# Patient Record
Sex: FEMALE
[De-identification: names, ages, dates, MRNs, and addresses within clinical notes are randomized per-mention and may not be internally consistent; named-entity substitution may affect disease eponyms.]

---

## 2019-04-26 ENCOUNTER — HOSPITAL ENCOUNTER (EMERGENCY)
Dept: HOSPITAL 31 - C.ER | Age: 24
Discharge: HOME | End: 2019-04-26
Payer: MEDICAID

## 2019-04-26 VITALS — RESPIRATION RATE: 15 BRPM | HEART RATE: 66 BPM | DIASTOLIC BLOOD PRESSURE: 71 MMHG | SYSTOLIC BLOOD PRESSURE: 107 MMHG

## 2019-04-26 VITALS — TEMPERATURE: 98.1 F

## 2019-04-26 VITALS — OXYGEN SATURATION: 100 %

## 2019-04-26 DIAGNOSIS — R55: Primary | ICD-10-CM

## 2019-04-26 DIAGNOSIS — R42: ICD-10-CM

## 2019-04-26 LAB
ALBUMIN SERPL-MCNC: 4.4 G/DL (ref 3.5–5)
ALBUMIN/GLOB SERPL: 1.5 {RATIO} (ref 1–2.1)
ALT SERPL-CCNC: 36 U/L (ref 9–52)
AST SERPL-CCNC: 29 U/L (ref 14–36)
BACTERIA #/AREA URNS HPF: (no result) /[HPF]
BASOPHILS # BLD AUTO: 0 K/UL (ref 0–0.2)
BASOPHILS NFR BLD: 0.7 % (ref 0–2)
BILIRUB UR-MCNC: NEGATIVE MG/DL
BUN SERPL-MCNC: 10 MG/DL (ref 7–17)
CALCIUM SERPL-MCNC: 9.3 MG/DL (ref 8.6–10.4)
EOSINOPHIL # BLD AUTO: 0 K/UL (ref 0–0.7)
EOSINOPHIL NFR BLD: 0.3 % (ref 0–4)
ERYTHROCYTE [DISTWIDTH] IN BLOOD BY AUTOMATED COUNT: 13.9 % (ref 11.5–14.5)
GFR NON-AFRICAN AMERICAN: > 60
GLUCOSE UR STRIP-MCNC: NORMAL MG/DL
HGB BLD-MCNC: 12 G/DL (ref 11–16)
LEUKOCYTE ESTERASE UR-ACNC: (no result) LEU/UL
LYMPHOCYTES # BLD AUTO: 1.2 K/UL (ref 1–4.3)
LYMPHOCYTES NFR BLD AUTO: 18.8 % (ref 20–40)
MCH RBC QN AUTO: 26.8 PG (ref 27–31)
MCHC RBC AUTO-ENTMCNC: 32.4 G/DL (ref 33–37)
MCV RBC AUTO: 82.7 FL (ref 81–99)
MONOCYTES # BLD: 0.5 K/UL (ref 0–0.8)
MONOCYTES NFR BLD: 8.7 % (ref 0–10)
NEUTROPHILS # BLD: 4.5 K/UL (ref 1.8–7)
NEUTROPHILS NFR BLD AUTO: 71.5 % (ref 50–75)
NRBC BLD AUTO-RTO: 0 % (ref 0–2)
PH UR STRIP: 6 [PH] (ref 5–8)
PLATELET # BLD: 293 K/UL (ref 130–400)
PMV BLD AUTO: 8.1 FL (ref 7.2–11.7)
PROT UR STRIP-MCNC: NEGATIVE MG/DL
RBC # BLD AUTO: 4.48 MIL/UL (ref 3.8–5.2)
RBC # UR STRIP: (no result) /UL
SP GR UR STRIP: 1.01 (ref 1–1.03)
SQUAMOUS EPITHIAL: 11 /HPF (ref 0–5)
URINE AMORPHOUS SEDIMENT: (no result) /UL
UROBILINOGEN UR-MCNC: NORMAL MG/DL (ref 0.2–1)
WBC # BLD AUTO: 6.3 K/UL (ref 4.8–10.8)

## 2019-04-26 PROCEDURE — 99285 EMERGENCY DEPT VISIT HI MDM: CPT

## 2019-04-26 PROCEDURE — 80053 COMPREHEN METABOLIC PANEL: CPT

## 2019-04-26 PROCEDURE — 84100 ASSAY OF PHOSPHORUS: CPT

## 2019-04-26 PROCEDURE — 83735 ASSAY OF MAGNESIUM: CPT

## 2019-04-26 PROCEDURE — 93005 ELECTROCARDIOGRAM TRACING: CPT

## 2019-04-26 PROCEDURE — 82948 REAGENT STRIP/BLOOD GLUCOSE: CPT

## 2019-04-26 PROCEDURE — 85025 COMPLETE CBC W/AUTO DIFF WBC: CPT

## 2019-04-26 PROCEDURE — 96360 HYDRATION IV INFUSION INIT: CPT

## 2019-04-26 PROCEDURE — 81001 URINALYSIS AUTO W/SCOPE: CPT

## 2019-04-26 NOTE — C.PDOC
History Of Present Illness





Patient reports that she was feeling dizzy earlier today after getting out of 

the shower. She is unsure if she passed out, but mother states that she thinks 

the patient did. No seizure activity noted. Patient states that her dizziness 

has improved, and denies any other symptoms- no fever, nausea, vomiting, 

headache, vision changes, chest pain, dyspnea, abdominal pain, diarrhea. Family 

notes that patient drank some water this morning, but has not been eating much.


Time Seen by Provider: 19 13:40


Chief Complaint (Nursing): Dizziness/Lightheaded


History Per: Patient, Family





Past Medical History


Reviewed: Historical Data, Nursing Documentation, Vital Signs


Vital Signs: 





                                Last Vital Signs











Temp  98.1 F   19 13:29


 


Pulse  61   19 13:29


 


Resp  18   19 13:29


 


BP  101/70   19 13:29


 


Pulse Ox  100   19 13:29











RYDER Report Viewed: Yes





- Medical History


Other PMH: Developmental delay


Family History: States: Unknown Family Hx





- Social History


Hx Alcohol Use: No


Hx Substance Use: No





Review Of Systems


Except As Marked, All Systems Reviewed And Found Negative.


Constitutional: Negative for: Fever, Chills


Eyes: Negative for: Vision Change


Cardiovascular: Negative for: Chest Pain, Palpitations


Respiratory: Negative for: Cough, Shortness of Breath


Gastrointestinal: Negative for: Nausea, Vomiting, Abdominal Pain, Diarrhea


Genitourinary: Negative for: Dysuria


Neurological: Positive for: Dizziness.  Negative for: Weakness, Numbness, Change

in Speech, Seizures, Headache





Physical Exam





- Physical Exam


Appears: Well, Non-toxic, No Acute Distress


Skin: Normal Color, Warm, Dry, No Diaphoretic


Head: Atraumatic, Normacephalic


Eye(s): bilateral: Normal Inspection, PERRL, EOMI, Other (No nystagmus)


Oral Mucosa: Moist


Cardiovascular: Rhythm Regular


Respiratory: Normal Breath Sounds


Gastrointestinal/Abdominal: Normal Exam


Extremity: Normal ROM


Extremity: Bilateral: Atraumatic, Normal Color And Temperature


Neurological/Psych: Oriented x3, Normal Speech, No Cerebellar Signs (finger to 

nose intact bilaterally), Normal Motor (5/5 strength all extremities), Normal 

Sensation





ED Course And Treatment





- Laboratory Results


Result Diagrams: 


                                 19 14:34





                                 19 14:34


ECG: Interpreted By Me


ECG Rhythm: Sinus Rhythm


ECG Interpretation: Normal


Interpretation Of ECG: normal axis, normal intervals, no ST elevation, no T wave

changes


Rate From EC


O2 Sat by Pulse Oximetry: 100


Pulse Ox Interpretation: Normal





Medical Decision Making


Medical Decision MakinL NS bolus given. Dizziness resolved. Patient able to ambulate without 

difficulty. EKG and labs were unremarkable, results discussed with patient and 

family. No further syncopal episodes. Stable for discharge home. Advised 

outpatient followup. Return to the ED for any new or worsening symptoms.





Disposition





- Disposition


Disposition: HOME/ ROUTINE


Disposition Time: 16:56


Condition: STABLE


Additional Instructions: 





DOMINIC DONOVAN, thank you for letting us take care of you today. Your 

provider was Anna Thrasher MD and you were treated for DIZZINESS. The 

emergency medical care you received today was directed at your acute symptoms. 

If you were prescribed any medication, please fill it and take as directed. It 

may take several days for your symptoms to resolve. Return to the Emergency 

Department if your symptoms worsen, do not improve, or if you have any other 

problems.





Please contact your doctor or call one of the physicians/clinics you have been 

referred to that are listed on the Patient Visit Information form that is 

included in your discharge packet. Bring any paperwork you were given at 

discharge with you along with any medications you are taking to your follow up 

visit. Our treatment cannot replace ongoing medical care by a primary care 

provider outside of the emergency department.





Thank you for allowing the [facility] team to be part of your care today.








If you had an X-Ray or CT scan: A Radiologist will review the ED reading if any 

change in treatment is needed we will contact you.***





If you had a blood, urine, or wound culture: It will take several days for the 

results, if any change in treatment is needed we will contact you.***





If you had an STI test: It will take 48 hours for the results. Please call after

1 week if you have not heard back.***


Prescriptions: 


Nitrofurantoin Macrocrystals [Macrobid] 100 mg PO BID #14 cap


Instructions:  Urinary Tract Infection, Adult (DC), Syncope (Fainting) (DC)


Forms:  [employer] (Urdu)


Print Language: [place]





- Clinical Impression


Clinical Impression: 


 Syncope, Dizziness

## 2019-04-30 NOTE — CARD
--------------- APPROVED REPORT --------------





Date of service: 04/26/2019



EKG Measurement

Heart [id number]

RI [id number]

[id number]

[id number]

[id number]



<Conclusion>

Normal sinus rhythm

Normal ECG